# Patient Record
Sex: MALE | Race: WHITE | NOT HISPANIC OR LATINO | URBAN - METROPOLITAN AREA
[De-identification: names, ages, dates, MRNs, and addresses within clinical notes are randomized per-mention and may not be internally consistent; named-entity substitution may affect disease eponyms.]

---

## 2023-09-18 ENCOUNTER — HOSPITAL ENCOUNTER (OUTPATIENT)
Dept: DATA CONVERSION | Facility: HOSPITAL | Age: 23
Discharge: HOME | End: 2023-09-18
Payer: COMMERCIAL

## 2023-09-18 DIAGNOSIS — S76.212A STRAIN OF ADDUCTOR MUSCLE, FASCIA AND TENDON OF LEFT THIGH, INITIAL ENCOUNTER: ICD-10-CM

## 2023-10-11 PROBLEM — R82.998 DARK URINE: Status: ACTIVE | Noted: 2023-10-11

## 2023-10-11 PROBLEM — L53.8 MACULAR ERYTHEMATOUS RASH: Status: ACTIVE | Noted: 2023-10-11

## 2023-10-11 PROBLEM — J02.9 SORE THROAT: Status: RESOLVED | Noted: 2023-10-11 | Resolved: 2023-10-11

## 2023-10-11 PROBLEM — J03.90 ACUTE TONSILLITIS: Status: RESOLVED | Noted: 2023-10-11 | Resolved: 2023-10-11

## 2023-10-11 PROBLEM — S81.012A LACERATION OF LEFT KNEE: Status: RESOLVED | Noted: 2023-10-11 | Resolved: 2023-10-11

## 2023-10-16 ENCOUNTER — OFFICE VISIT (OUTPATIENT)
Dept: PRIMARY CARE | Facility: CLINIC | Age: 23
End: 2023-10-16
Payer: COMMERCIAL

## 2023-10-16 ENCOUNTER — LAB (OUTPATIENT)
Dept: LAB | Facility: LAB | Age: 23
End: 2023-10-16
Payer: COMMERCIAL

## 2023-10-16 VITALS
SYSTOLIC BLOOD PRESSURE: 118 MMHG | DIASTOLIC BLOOD PRESSURE: 80 MMHG | WEIGHT: 159 LBS | BODY MASS INDEX: 24.96 KG/M2 | HEIGHT: 67 IN | TEMPERATURE: 98.2 F | HEART RATE: 81 BPM | OXYGEN SATURATION: 98 %

## 2023-10-16 DIAGNOSIS — F51.01 PRIMARY INSOMNIA: ICD-10-CM

## 2023-10-16 DIAGNOSIS — M25.50 PAIN IN JOINT, MULTIPLE SITES: ICD-10-CM

## 2023-10-16 DIAGNOSIS — M25.50 ARTHRALGIA OF MULTIPLE JOINTS: ICD-10-CM

## 2023-10-16 DIAGNOSIS — R00.2 PALPITATIONS: ICD-10-CM

## 2023-10-16 DIAGNOSIS — Z00.00 ROUTINE GENERAL MEDICAL EXAMINATION AT A HEALTH CARE FACILITY: Primary | ICD-10-CM

## 2023-10-16 DIAGNOSIS — M25.50 ARTHRALGIA, UNSPECIFIED JOINT: ICD-10-CM

## 2023-10-16 DIAGNOSIS — Z13.220 LIPID SCREENING: ICD-10-CM

## 2023-10-16 DIAGNOSIS — Z00.00 ROUTINE GENERAL MEDICAL EXAMINATION AT A HEALTH CARE FACILITY: ICD-10-CM

## 2023-10-16 DIAGNOSIS — I86.1 VARICOCELE: ICD-10-CM

## 2023-10-16 LAB
ALBUMIN SERPL BCP-MCNC: 4.9 G/DL (ref 3.4–5)
ALP SERPL-CCNC: 46 U/L (ref 33–120)
ALT SERPL W P-5'-P-CCNC: 15 U/L (ref 10–52)
ANION GAP SERPL CALC-SCNC: 14 MMOL/L (ref 10–20)
AST SERPL W P-5'-P-CCNC: 18 U/L (ref 9–39)
BILIRUB SERPL-MCNC: 0.6 MG/DL (ref 0–1.2)
BUN SERPL-MCNC: 17 MG/DL (ref 6–23)
CALCIUM SERPL-MCNC: 9.8 MG/DL (ref 8.6–10.3)
CHLORIDE SERPL-SCNC: 100 MMOL/L (ref 98–107)
CHOLEST SERPL-MCNC: 156 MG/DL (ref 0–199)
CHOLESTEROL/HDL RATIO: 3
CO2 SERPL-SCNC: 30 MMOL/L (ref 21–32)
CREAT SERPL-MCNC: 1.01 MG/DL (ref 0.5–1.3)
ERYTHROCYTE [DISTWIDTH] IN BLOOD BY AUTOMATED COUNT: 11.8 % (ref 11.5–14.5)
ERYTHROCYTE [SEDIMENTATION RATE] IN BLOOD BY WESTERGREN METHOD: <1 MM/H (ref 0–15)
GFR SERPL CREATININE-BSD FRML MDRD: >90 ML/MIN/1.73M*2
GLUCOSE SERPL-MCNC: 92 MG/DL (ref 74–99)
HCT VFR BLD AUTO: 47.2 % (ref 41–52)
HDLC SERPL-MCNC: 52.5 MG/DL
HGB BLD-MCNC: 15.2 G/DL (ref 13.5–17.5)
LDLC SERPL CALC-MCNC: 86 MG/DL
MAGNESIUM SERPL-MCNC: 2.22 MG/DL (ref 1.6–2.4)
MCH RBC QN AUTO: 28.4 PG (ref 26–34)
MCHC RBC AUTO-ENTMCNC: 32.2 G/DL (ref 32–36)
MCV RBC AUTO: 88 FL (ref 80–100)
NON HDL CHOLESTEROL: 104 MG/DL (ref 0–149)
NRBC BLD-RTO: 0 /100 WBCS (ref 0–0)
PLATELET # BLD AUTO: 272 X10*3/UL (ref 150–450)
PMV BLD AUTO: 10.2 FL (ref 7.5–11.5)
POTASSIUM SERPL-SCNC: 4.2 MMOL/L (ref 3.5–5.3)
PROT SERPL-MCNC: 7.5 G/DL (ref 6.4–8.2)
RBC # BLD AUTO: 5.36 X10*6/UL (ref 4.5–5.9)
RHEUMATOID FACT SER NEPH-ACNC: <10 IU/ML (ref 0–15)
SODIUM SERPL-SCNC: 140 MMOL/L (ref 136–145)
TRIGL SERPL-MCNC: 90 MG/DL (ref 0–149)
TSH SERPL-ACNC: 1.44 MIU/L (ref 0.44–3.98)
VLDL: 18 MG/DL (ref 0–40)
WBC # BLD AUTO: 6.7 X10*3/UL (ref 4.4–11.3)

## 2023-10-16 PROCEDURE — 90686 IIV4 VACC NO PRSV 0.5 ML IM: CPT | Performed by: FAMILY MEDICINE

## 2023-10-16 PROCEDURE — 93000 ELECTROCARDIOGRAM COMPLETE: CPT | Performed by: FAMILY MEDICINE

## 2023-10-16 PROCEDURE — 36415 COLL VENOUS BLD VENIPUNCTURE: CPT

## 2023-10-16 PROCEDURE — 84443 ASSAY THYROID STIM HORMONE: CPT

## 2023-10-16 PROCEDURE — 99385 PREV VISIT NEW AGE 18-39: CPT | Performed by: FAMILY MEDICINE

## 2023-10-16 PROCEDURE — 1036F TOBACCO NON-USER: CPT | Performed by: FAMILY MEDICINE

## 2023-10-16 PROCEDURE — 86038 ANTINUCLEAR ANTIBODIES: CPT

## 2023-10-16 PROCEDURE — 80061 LIPID PANEL: CPT

## 2023-10-16 PROCEDURE — 85652 RBC SED RATE AUTOMATED: CPT

## 2023-10-16 PROCEDURE — 85027 COMPLETE CBC AUTOMATED: CPT

## 2023-10-16 PROCEDURE — 90471 IMMUNIZATION ADMIN: CPT | Performed by: FAMILY MEDICINE

## 2023-10-16 PROCEDURE — 80053 COMPREHEN METABOLIC PANEL: CPT

## 2023-10-16 PROCEDURE — 86431 RHEUMATOID FACTOR QUANT: CPT

## 2023-10-16 PROCEDURE — 83735 ASSAY OF MAGNESIUM: CPT

## 2023-10-16 ASSESSMENT — ENCOUNTER SYMPTOMS
FREQUENCY: 1
POLYDIPSIA: 1
CONSTIPATION: 0
SHORTNESS OF BREATH: 0
DIARRHEA: 0
NAUSEA: 1
VOMITING: 0

## 2023-10-16 NOTE — PATIENT INSTRUCTIONS
Get your blood work as ordered.  You should hear from our office with results whether they are normal are not within a few days.  Please call the office if you do not hear from us.     You should be getting cardiovascular exercise 3-5 times per week for 30-45 minutes.  This includes exercises such as running, brisk walking, biking or swimming.     Continue to follow-up with urology for testicular pain, varicocele    Insomnia  MEASURES TO IMPROVE SLEEP QUALITY  1. Go to sleep and wake up at the same time every day, even on holidays and weekends.  2. Use the bed for sleep and sexual activity only, not for reading, watching television, or working. No SCREENS - tablets, phones.  The light emitted from these devices can affect your natural sleep chemical production and negatively affect sleep.  3. Avoid napping for more than 20-30min and not within 6 hours of bedtime.  4. If sleep does not begin within 20 to 30 minutes, leave the bed and return only when drowsy.  5. Keep the room quiet, cool, and dark.  6. Use ear plugs to decrease ambient noise.  7. Avoid caffeine within six hours of bedtime perhaps AT ALL as some people can become very caffeine sensitive.  8. Avoid alcohol and smoking - although both seem to cause a relaxing effect - they in fact will cause poor quality sleep.  9. Establish relaxation pre-sleep rituals, such as a warm bath or reading.  10. Wear socks to bed (lowers core body temperature).  11. Exercise earlier in the day.    IF WAKE UP prior to desired time and cannot fall back asleep within 10min, use technique of concentrating on what you feel of your body - pressure of pillow, mattress, blankets on body.  This focus will distract your cortex of brain that keeps you awake thinking of all that happened prior, or might happen tomorrow keeping you awake.  By turning off the cortex and focusing on basic sense of feel you can relax your brain and allow falling asleep.     Over-the-counter melatonin can  oftentimes help with sleeping, 5-8 mg at night before you go to bed as the recommended dose.  Melatonin does not cause any dependence and can use on a regular basis.  Other sleep medications including some of the over-the-counter medications such as diphenhydramine can cause dependence when used on a regular basis and should only be used intermittently when really needed.     Watch for any recurring symptoms with palpitations  Recurring bowel issues

## 2023-10-16 NOTE — PROGRESS NOTES
Subjective   Patient ID: Oswaldo Baird is a 23 y.o. male who presents for Annual Exam and Establish Care. Pt was referred by his Mom which is a current pt of ours. States he developed widespread joint pains one week ago; tried ice and heat; Ibuprofen did not help. Fasting for labs. [place] to receive a flu vaccine.     Patient is new patient here to establish for his physical      Recently saw urology for varicocele, testicular pain    Not working recently   Some achiness     Elbows and shoulders are a little sore   A little swelling in wrists     Not exercising much ,  more pain ,  resting due to testicular issue   Waiting to see surgeon      Family hx :  no autoimmune diseases, heart disease , cancer     Usually pretty active  with work     Rare elevated HR about 1-2 times per week recently   Sometimes trouble sleeping ,  and staying asleep   No relief with melatonin ,     No depression ,  anxiety        ROS :  ( No or Yes )  Any eye problems:    N  Frequent nasal congestion or sneezing:  N  Difficulty hearing:  N  Ear problems:   N  Asthma or wheezing:   N  Frequent cough:   N  Shortness of breath:N  Hemoptysis: N  Hx of TB: N  High blood pressure: N  Heart disease: N  Heart murmur:N  Chest pain or pressure with exertion:N  Leg pains with walking up hill: N  Fast heartbeat or palpitations:y  Varicose veins: y  Difficulty swallowing foods or liquids: N  Abdominal pains: N  Frequent indigestion or heartburn: N  Constipation: N  Diarrhea or loose stools: N  Weight changes recently: N  Change in bowel movements: N  An ulcer: N  Black stools: N  Jaundice, hepatitis or liver problems: N  Gallstones or gallbladder problems: N  Stomach or intestinal problems: N  Vomited blood : N  Blood in bowel movements: y  Sickle cell trait  or Anemia: N  Been refused as a blood donor: N  Problems with her kidney, bladder, or prostate: N  Loss of control of your urine: N  Pain or burning with urination: N  Blood in her urine:  "N  Trouble starting flow of urine: N  Frequent urination at night: N  History of venereal disease: N  Any skin problems: N  Diabetes: N  Thyroid disease: N  Frequent back pain: y  Pain or swelling around joints: y  Broken any bones: N  Frequent headaches: N  Dizziness: N  Have you ever had Seizures or convulsions: N  Have you ever temporarily lost control of your hand or foot : N   Had a stroke or been paralyzed : N  Temporarily lost your ability to speak: N  Fainted or lost consciousness: N  Hallucinations: N  Nervousness: N  Do you take medications for your nerves: N  Trouble falling asleep or staying asleep: y  Do you feel tired even after a good night sleep: y  Do you feel down in the dumps or depressed: N  Frequent crying: N  Using alcohol excessively: N  Any street drug use : N  Do have any other medical problems that are concerns : y joint pains    Review of Systems   Respiratory:  Negative for shortness of breath.    Cardiovascular:  Negative for chest pain.   Gastrointestinal:  Positive for nausea. Negative for constipation, diarrhea and vomiting.        One bloody stool a month ago      Endocrine: Positive for polydipsia and polyuria.   Genitourinary:  Positive for frequency.        Testicular pain        Objective   /80   Pulse 81   Temp 36.8 °C (98.2 °F)   Ht 1.708 m (5' 7.25\")   Wt 72.1 kg (159 lb)   SpO2 98%   BMI 24.72 kg/m²     Physical Exam  Constitutional:       General: He is not in acute distress.     Appearance: Normal appearance.   HENT:      Head: Normocephalic and atraumatic.      Right Ear: Tympanic membrane and ear canal normal.      Left Ear: Tympanic membrane and ear canal normal.      Mouth/Throat:      Mouth: Mucous membranes are moist.   Eyes:      Conjunctiva/sclera: Conjunctivae normal.      Pupils: Pupils are equal, round, and reactive to light.   Neck:      Vascular: No carotid bruit.   Cardiovascular:      Rate and Rhythm: Normal rate and regular rhythm.      Heart " sounds: No murmur heard.  Pulmonary:      Effort: Pulmonary effort is normal.      Breath sounds: Normal breath sounds. No wheezing or rhonchi.   Abdominal:      General: Bowel sounds are normal.      Palpations: Abdomen is soft.   Musculoskeletal:         General: No swelling.      Cervical back: No rigidity.   Lymphadenopathy:      Cervical: No cervical adenopathy.   Skin:     General: Skin is warm and dry.      Findings: No rash.   Neurological:      Mental Status: He is alert.         Assessment/Plan   Problem List Items Addressed This Visit    None  Visit Diagnoses         Codes    Routine general medical examination at a health care facility    -  Primary Z00.00    Relevant Orders    CBC    Comprehensive Metabolic Panel    Lipid Panel    SAUMYA with Reflex to DORA    Rheumatoid Factor    Sedimentation Rate    Tsh With Reflex To Free T4 If Abnormal    Magnesium    ECG 12 lead (Clinic Performed) (Completed)    Flu vaccine (IIV4) age 6 months and greater, preservative free (Completed)    Lipid screening     Z13.220    Relevant Orders    CBC    Comprehensive Metabolic Panel    Lipid Panel    SAUMYA with Reflex to DORA    Rheumatoid Factor    Sedimentation Rate    Tsh With Reflex To Free T4 If Abnormal    Magnesium    ECG 12 lead (Clinic Performed) (Completed)    Varicocele     I86.1    Relevant Orders    CBC    Comprehensive Metabolic Panel    Lipid Panel    SAUMYA with Reflex to DORA    Rheumatoid Factor    Sedimentation Rate    Tsh With Reflex To Free T4 If Abnormal    Magnesium    ECG 12 lead (Clinic Performed) (Completed)    Primary insomnia     F51.01    Relevant Orders    CBC    Comprehensive Metabolic Panel    Lipid Panel    SAUMYA with Reflex to DORA    Rheumatoid Factor    Sedimentation Rate    Tsh With Reflex To Free T4 If Abnormal    Magnesium    ECG 12 lead (Clinic Performed) (Completed)    Arthralgia, unspecified joint     M25.50    Relevant Orders    CBC    Comprehensive Metabolic Panel    Lipid Panel    SAUMYA with  Reflex to DORA    Rheumatoid Factor    Sedimentation Rate    Tsh With Reflex To Free T4 If Abnormal    Magnesium    ECG 12 lead (Clinic Performed) (Completed)    Palpitations     R00.2    Relevant Orders    CBC    Comprehensive Metabolic Panel    Lipid Panel    SAUMYA with Reflex to DORA    Rheumatoid Factor    Sedimentation Rate    Tsh With Reflex To Free T4 If Abnormal    Magnesium    ECG 12 lead (Clinic Performed) (Completed)    Arthralgia of multiple joints     M25.50    Relevant Orders    CBC    Comprehensive Metabolic Panel    Lipid Panel    SAUMYA with Reflex to DORA    Rheumatoid Factor    Sedimentation Rate    Tsh With Reflex To Free T4 If Abnormal    Magnesium    ECG 12 lead (Clinic Performed) (Completed)    Pain in joint, multiple sites     M25.50    Relevant Orders    CBC    Comprehensive Metabolic Panel    Lipid Panel    SAUMYA with Reflex to DORA    Rheumatoid Factor    Sedimentation Rate    Tsh With Reflex To Free T4 If Abnormal    Magnesium    ECG 12 lead (Clinic Performed) (Completed)

## 2023-10-17 ENCOUNTER — TELEPHONE (OUTPATIENT)
Dept: PRIMARY CARE | Facility: CLINIC | Age: 23
End: 2023-10-17
Payer: COMMERCIAL

## 2023-10-17 LAB — ANA SER QL HEP2 SUBST: NEGATIVE

## 2023-10-17 NOTE — TELEPHONE ENCOUNTER
----- Message from Tiffany Garcia MD sent at 10/17/2023  1:58 PM EDT -----  Labs are normal: Arthritic markers, blood count, cholesterol, thyroid, kidney and liver function

## 2023-10-17 NOTE — TELEPHONE ENCOUNTER
Result Communication    Resulted Orders   CBC   Result Value Ref Range    WBC 6.7 4.4 - 11.3 x10*3/uL    nRBC 0.0 0.0 - 0.0 /100 WBCs    RBC 5.36 4.50 - 5.90 x10*6/uL    Hemoglobin 15.2 13.5 - 17.5 g/dL    Hematocrit 47.2 41.0 - 52.0 %    MCV 88 80 - 100 fL    MCH 28.4 26.0 - 34.0 pg    MCHC 32.2 32.0 - 36.0 g/dL    RDW 11.8 11.5 - 14.5 %    Platelets 272 150 - 450 x10*3/uL    MPV 10.2 7.5 - 11.5 fL   Comprehensive Metabolic Panel   Result Value Ref Range    Glucose 92 74 - 99 mg/dL    Sodium 140 136 - 145 mmol/L    Potassium 4.2 3.5 - 5.3 mmol/L    Chloride 100 98 - 107 mmol/L    Bicarbonate 30 21 - 32 mmol/L    Anion Gap 14 10 - 20 mmol/L    Urea Nitrogen 17 6 - 23 mg/dL    Creatinine 1.01 0.50 - 1.30 mg/dL    eGFR >90 >60 mL/min/1.73m*2      Comment:      Calculations of estimated GFR are performed using the 2021 CKD-EPI Study Refit equation without the race variable for the IDMS-Traceable creatinine methods.  https://jasn.asnjournals.org/content/early/2021/09/22/ASN.8058947766    Calcium 9.8 8.6 - 10.3 mg/dL    Albumin 4.9 3.4 - 5.0 g/dL    Alkaline Phosphatase 46 33 - 120 U/L    Total Protein 7.5 6.4 - 8.2 g/dL    AST 18 9 - 39 U/L    Bilirubin, Total 0.6 0.0 - 1.2 mg/dL    ALT 15 10 - 52 U/L      Comment:      Patients treated with Sulfasalazine may generate falsely decreased results for ALT.   Lipid Panel   Result Value Ref Range    Cholesterol 156 0 - 199 mg/dL      Comment:            Age      Desirable   Borderline High   High     0-19 Y     0 - 169       170 - 199     >/= 200    20-24 Y     0 - 189       190 - 224     >/= 225         >24 Y     0 - 199       200 - 239     >/= 240   **All ranges are based on fasting samples. Specific   therapeutic targets will vary based on patient-specific   cardiac risk.    Pediatric guidelines reference:Pediatrics 2011, 128(S5).Adult guidelines reference: NCEP ATPIII Guidelines,MACY 2001, 258:2486-97    Venipuncture immediately after or during the administration of  Metamizole may lead to falsely low results. Testing should be performed immediately prior to Metamizole dosing.    HDL-Cholesterol 52.5 mg/dL      Comment:        Age       Very Low   Low     Normal    High    0-19 Y    < 35      < 40     40-45     ----  20-24 Y    ----     < 40      >45      ----        >24 Y      ----     < 40     40-60      >60      Cholesterol/HDL Ratio 3.0       Comment:        Ref Values  Desirable  < 3.4  High Risk  > 5.0    LDL Calculated 86 <=119 mg/dL      Comment:                                  Near   Borderline      AGE      Desirable  Optimal    High     High     Very High     0-19 Y     0 - 109     ---    110-129   >/= 130     ----    20-24 Y     0 - 119     ---    120-159   >/= 160     ----      >24 Y     0 -  99   100-129  130-159   160-189     >/=190      VLDL 18 0 - 40 mg/dL    Triglycerides 90 0 - 149 mg/dL      Comment:         Age         Desirable   Borderline High   High     Very High   0 D-90 D    19 - 174         ----         ----        ----  91 D- 9 Y     0 -  74        75 -  99     >/= 100      ----    10-19 Y     0 -  89        90 - 129     >/= 130      ----    20-24 Y     0 - 114       115 - 149     >/= 150      ----         >24 Y     0 - 149       150 - 199    200- 499    >/= 500    Venipuncture immediately after or during the administration of Metamizole may lead to falsely low results. Testing should be performed immediately prior to Metamizole dosing.    Non HDL Cholesterol 104 0 - 149 mg/dL      Comment:            Age       Desirable   Borderline High   High     Very High     0-19 Y     0 - 119       120 - 144     >/= 145    >/= 160    20-24 Y     0 - 149       150 - 189     >/= 190      ----         >24 Y    30 mg/dL above LDL Cholesterol goal     SAUMYA with Reflex to DORA   Result Value Ref Range    SAUMYA Negative Negative      Comment:      The Antinuclear Antibody (SAUMYA) test was performed using  indirect immunofluorescence assay with HEp-2 cells slide.    Rheumatoid Factor   Result Value Ref Range    Rheumatoid Factor <10 0 - 15 IU/mL   Sedimentation Rate   Result Value Ref Range    Sedimentation Rate <1 0 - 15 mm/h   Tsh With Reflex To Free T4 If Abnormal   Result Value Ref Range    Thyroid Stimulating Hormone 1.44 0.44 - 3.98 mIU/L    Narrative    TSH testing is performed using different testing methodology at [facility] than at other [facility]. Direct result comparisons should only be made within the same method.     Magnesium   Result Value Ref Range    Magnesium 2.22 1.60 - 2.40 mg/dL       3:56 PM  Tiffany Garcia MD  [place] [address] Clinical Support Staff  Labs are normal: Arthritic markers, blood count, cholesterol, thyroid, kidney and liver function    Results were successfully communicated with the patient and they acknowledged their understanding.

## 2023-10-17 NOTE — TELEPHONE ENCOUNTER
Result Communication    Resulted Orders   CBC   Result Value Ref Range    WBC 6.7 4.4 - 11.3 x10*3/uL    nRBC 0.0 0.0 - 0.0 /100 WBCs    RBC 5.36 4.50 - 5.90 x10*6/uL    Hemoglobin 15.2 13.5 - 17.5 g/dL    Hematocrit 47.2 41.0 - 52.0 %    MCV 88 80 - 100 fL    MCH 28.4 26.0 - 34.0 pg    MCHC 32.2 32.0 - 36.0 g/dL    RDW 11.8 11.5 - 14.5 %    Platelets 272 150 - 450 x10*3/uL    MPV 10.2 7.5 - 11.5 fL   Comprehensive Metabolic Panel   Result Value Ref Range    Glucose 92 74 - 99 mg/dL    Sodium 140 136 - 145 mmol/L    Potassium 4.2 3.5 - 5.3 mmol/L    Chloride 100 98 - 107 mmol/L    Bicarbonate 30 21 - 32 mmol/L    Anion Gap 14 10 - 20 mmol/L    Urea Nitrogen 17 6 - 23 mg/dL    Creatinine 1.01 0.50 - 1.30 mg/dL    eGFR >90 >60 mL/min/1.73m*2      Comment:      Calculations of estimated GFR are performed using the 2021 CKD-EPI Study Refit equation without the race variable for the IDMS-Traceable creatinine methods.  https://jasn.asnjournals.org/content/early/2021/09/22/ASN.6852495181    Calcium 9.8 8.6 - 10.3 mg/dL    Albumin 4.9 3.4 - 5.0 g/dL    Alkaline Phosphatase 46 33 - 120 U/L    Total Protein 7.5 6.4 - 8.2 g/dL    AST 18 9 - 39 U/L    Bilirubin, Total 0.6 0.0 - 1.2 mg/dL    ALT 15 10 - 52 U/L      Comment:      Patients treated with Sulfasalazine may generate falsely decreased results for ALT.   Lipid Panel   Result Value Ref Range    Cholesterol 156 0 - 199 mg/dL      Comment:            Age      Desirable   Borderline High   High     0-19 Y     0 - 169       170 - 199     >/= 200    20-24 Y     0 - 189       190 - 224     >/= 225         >24 Y     0 - 199       200 - 239     >/= 240   **All ranges are based on fasting samples. Specific   therapeutic targets will vary based on patient-specific   cardiac risk.    Pediatric guidelines reference:Pediatrics 2011, 128(S5).Adult guidelines reference: NCEP ATPIII Guidelines,MACY 2001, 258:2486-97    Venipuncture immediately after or during the administration of  Metamizole may lead to falsely low results. Testing should be performed immediately prior to Metamizole dosing.    HDL-Cholesterol 52.5 mg/dL      Comment:        Age       Very Low   Low     Normal    High    0-19 Y    < 35      < 40     40-45     ----  20-24 Y    ----     < 40      >45      ----        >24 Y      ----     < 40     40-60      >60      Cholesterol/HDL Ratio 3.0       Comment:        Ref Values  Desirable  < 3.4  High Risk  > 5.0    LDL Calculated 86 <=119 mg/dL      Comment:                                  Near   Borderline      AGE      Desirable  Optimal    High     High     Very High     0-19 Y     0 - 109     ---    110-129   >/= 130     ----    20-24 Y     0 - 119     ---    120-159   >/= 160     ----      >24 Y     0 -  99   100-129  130-159   160-189     >/=190      VLDL 18 0 - 40 mg/dL    Triglycerides 90 0 - 149 mg/dL      Comment:         Age         Desirable   Borderline High   High     Very High   0 D-90 D    19 - 174         ----         ----        ----  91 D- 9 Y     0 -  74        75 -  99     >/= 100      ----    10-19 Y     0 -  89        90 - 129     >/= 130      ----    20-24 Y     0 - 114       115 - 149     >/= 150      ----         >24 Y     0 - 149       150 - 199    200- 499    >/= 500    Venipuncture immediately after or during the administration of Metamizole may lead to falsely low results. Testing should be performed immediately prior to Metamizole dosing.    Non HDL Cholesterol 104 0 - 149 mg/dL      Comment:            Age       Desirable   Borderline High   High     Very High     0-19 Y     0 - 119       120 - 144     >/= 145    >/= 160    20-24 Y     0 - 149       150 - 189     >/= 190      ----         >24 Y    30 mg/dL above LDL Cholesterol goal     SAUMYA with Reflex to DORA   Result Value Ref Range    SAUMYA Negative Negative      Comment:      The Antinuclear Antibody (SAUMYA) test was performed using  indirect immunofluorescence assay with HEp-2 cells slide.    Rheumatoid Factor   Result Value Ref Range    Rheumatoid Factor <10 0 - 15 IU/mL   Sedimentation Rate   Result Value Ref Range    Sedimentation Rate <1 0 - 15 mm/h   Tsh With Reflex To Free T4 If Abnormal   Result Value Ref Range    Thyroid Stimulating Hormone 1.44 0.44 - 3.98 mIU/L    Narrative    TSH testing is performed using different testing methodology at [facility] than at other [facility]. Direct result comparisons should only be made within the same method.     Magnesium   Result Value Ref Range    Magnesium 2.22 1.60 - 2.40 mg/dL       3:58 PM  Tiffany Garcia MD  [place] [address] Clinical Support Staff  Labs are normal: Arthritic markers, blood count, cholesterol, thyroid, kidney and liver function    Results were successfully communicated with the patient and they acknowledged their understanding.

## 2023-11-02 ENCOUNTER — OFFICE VISIT (OUTPATIENT)
Dept: PRIMARY CARE | Facility: CLINIC | Age: 23
End: 2023-11-02
Payer: COMMERCIAL

## 2023-11-02 ENCOUNTER — ANCILLARY PROCEDURE (OUTPATIENT)
Dept: RADIOLOGY | Facility: CLINIC | Age: 23
End: 2023-11-02
Payer: COMMERCIAL

## 2023-11-02 VITALS
DIASTOLIC BLOOD PRESSURE: 82 MMHG | TEMPERATURE: 98.4 F | OXYGEN SATURATION: 99 % | HEART RATE: 65 BPM | SYSTOLIC BLOOD PRESSURE: 116 MMHG | HEIGHT: 67 IN | BODY MASS INDEX: 24.01 KG/M2 | WEIGHT: 153 LBS

## 2023-11-02 DIAGNOSIS — R07.89 CHEST PRESSURE: ICD-10-CM

## 2023-11-02 DIAGNOSIS — R07.89 CHEST PRESSURE: Primary | ICD-10-CM

## 2023-11-02 DIAGNOSIS — R00.2 PALPITATIONS: ICD-10-CM

## 2023-11-02 PROCEDURE — 71111 X-RAY EXAM RIBS/CHEST4/> VWS: CPT | Mod: FY

## 2023-11-02 PROCEDURE — 71111 X-RAY EXAM RIBS/CHEST4/> VWS: CPT | Performed by: RADIOLOGY

## 2023-11-02 PROCEDURE — 93000 ELECTROCARDIOGRAM COMPLETE: CPT | Performed by: FAMILY MEDICINE

## 2023-11-02 PROCEDURE — 99214 OFFICE O/P EST MOD 30 MIN: CPT | Performed by: FAMILY MEDICINE

## 2023-11-02 PROCEDURE — 1036F TOBACCO NON-USER: CPT | Performed by: FAMILY MEDICINE

## 2023-11-02 RX ORDER — ONDANSETRON 4 MG/1
4 TABLET, FILM COATED ORAL EVERY 8 HOURS PRN
COMMUNITY
Start: 2023-10-27

## 2023-11-02 RX ORDER — KETOROLAC TROMETHAMINE 10 MG/1
10 TABLET, FILM COATED ORAL 3 TIMES DAILY
COMMUNITY
Start: 2023-10-30 | End: 2023-11-04

## 2023-11-02 RX ORDER — AMOXICILLIN 875 MG/1
TABLET, FILM COATED ORAL
COMMUNITY
Start: 2023-10-27 | End: 2023-12-13 | Stop reason: ALTCHOICE

## 2023-11-02 NOTE — PROGRESS NOTES
"Subjective   Patient ID: Oswaldo Baird is a 23 y.o. male who presents for intermittent  Chest Pain and ER follow up. Pt went to [place] ER this Monday due to right groin pain; was prescribed Toradol. States he was sitting eating his lunch yesterday and after wards he felt overly full in his upper abdomen; tenderness in his ribs and elevated heart rate with an irregular beat.  Pt stopped caffeine 2 months ago because he has been home on a medical leave due to a work related injury. Pt is schedule dot see a [facility] microsurgeon/ urologist on 11/10/23.     Was eating   Had soreness on lower ribs   Slight SHORTNESS OF BREATH   Heart was racing , felt like irregular   For about 20-30  min      A little mid sternal tightness today       Previously only about once per month          Review of Systems    Objective   /82   Pulse 65   Temp 36.9 °C (98.4 °F)   Ht 1.708 m (5' 7.25\")   Wt 69.4 kg (153 lb)   SpO2 99%   BMI 23.79 kg/m²     Physical Exam  Constitutional:       Appearance: Normal appearance. He is well-developed.   Cardiovascular:      Rate and Rhythm: Normal rate and regular rhythm.      Heart sounds: Normal heart sounds. No murmur heard.     Comments: A little tenderness along the bilateral anterior lower ribs and sternal  Pulmonary:      Effort: Pulmonary effort is normal.      Breath sounds: Normal breath sounds.   Neurological:      General: No focal deficit present.      Mental Status: He is alert.   Psychiatric:         Mood and Affect: Mood normal.         Behavior: Behavior normal.         Assessment/Plan   Problem List Items Addressed This Visit    None  Visit Diagnoses         Codes    Chest pressure    -  Primary R07.89    Relevant Orders    ECG 12 lead (Clinic Performed) (Completed)    XR ribs 3 views bilateral w chest posteroanterior    Transthoracic Echo (TTE) Complete    Holter or Event Cardiac Monitor    Palpitations     R00.2    Relevant Orders    ECG 12 lead (Clinic " Performed) (Completed)    XR ribs 3 views bilateral w chest posteroanterior    Transthoracic Echo (TTE) Complete    Holter or Event Cardiac Monitor

## 2023-11-06 ENCOUNTER — TELEPHONE (OUTPATIENT)
Dept: PRIMARY CARE | Facility: CLINIC | Age: 23
End: 2023-11-06
Payer: COMMERCIAL

## 2023-11-06 NOTE — TELEPHONE ENCOUNTER
----- Message from Tiffany Garcia MD sent at 11/6/2023  9:39 AM EST -----  Rib and chest x-ray are normal

## 2023-11-06 NOTE — TELEPHONE ENCOUNTER
Result Communication    Resulted Orders   XR ribs 3 views bilateral w chest posteroanterior    Narrative    Interpreted By:  Darcie Melendez,   STUDY:  XR RIBS 3 VIEWS BILATERAL WITH CHEST POSTEROANTERIOR;      INDICATION:  Signs/Symptoms:pain.      COMPARISON:  None.      ACCESSION NUMBER(S):  [id number]      ORDERING CLINICIAN:  VASQUEZ RYAN      FINDINGS:  The cardiac silhouette size is within normal limits.  There is no focal consolidation, edema or pneumothorax.  No sizeable pleural effusion.  No acute osseous abnormality.  No displaced rib fractures bilaterally.        Impression    1. No displaced rib fractures bilaterally.  2. No acute cardiopulmonary process.      Signed by: Darcie Melendez 11/3/2023 9:57 PM  Dictation workstation:   [id number]       11:29 AM  Vasquez Ryan MD  [place] [address] Clinical Support Staff  Rib and chest x-ray are normal    Results were successfully communicated with the patient and they acknowledged their understanding.

## 2023-11-07 ENCOUNTER — HOSPITAL ENCOUNTER (OUTPATIENT)
Dept: CARDIOLOGY | Facility: HOSPITAL | Age: 23
Discharge: HOME | End: 2023-11-07
Payer: COMMERCIAL

## 2023-11-07 DIAGNOSIS — R07.89 CHEST PRESSURE: ICD-10-CM

## 2023-11-07 DIAGNOSIS — R00.2 PALPITATIONS: ICD-10-CM

## 2023-11-07 LAB
AORTIC VALVE MEAN GRADIENT: 2
AORTIC VALVE PEAK VELOCITY: 0.91
AV PEAK GRADIENT: 3.3
AVA (PEAK VEL): 2.68
AVA (VTI): 2.35
EJECTION FRACTION APICAL 4 CHAMBER: 57.6
EJECTION FRACTION: 57
LEFT ATRIUM VOLUME AREA LENGTH INDEX BSA: 15.5
LEFT VENTRICLE INTERNAL DIMENSION DIASTOLE: 4.3 (ref 3.5–6)
LEFT VENTRICULAR OUTFLOW TRACT DIAMETER: 2
MITRAL VALVE E/A RATIO: 1.44
MITRAL VALVE E/E' RATIO: 4.66
RIGHT VENTRICLE FREE WALL PEAK S': 11.5
RIGHT VENTRICLE PEAK SYSTOLIC PRESSURE: 18.5
TRICUSPID ANNULAR PLANE SYSTOLIC EXCURSION: 2

## 2023-11-07 PROCEDURE — 93306 TTE W/DOPPLER COMPLETE: CPT

## 2023-11-07 PROCEDURE — 93306 TTE W/DOPPLER COMPLETE: CPT | Performed by: INTERNAL MEDICINE

## 2023-11-07 PROCEDURE — 93246 EXT ECG>7D<15D RECORDING: CPT

## 2023-11-08 ENCOUNTER — TELEPHONE (OUTPATIENT)
Dept: PRIMARY CARE | Facility: CLINIC | Age: 23
End: 2023-11-08
Payer: COMMERCIAL

## 2023-11-08 NOTE — TELEPHONE ENCOUNTER
Result Communication    Resulted Orders   Transthoracic Echo (TTE) Complete   Result Value Ref Range    AV pk jamaal 0.91     AV mn grad 2.0     LVOT diam 2.00     LV biplane EF 57     MV avg E/e' ratio 4.66     MV E/A ratio 1.44     LA vol index A/L 15.5     Tricuspid annular plane systolic excursion 2.0     RV free wall pk S' 11.50     LVIDd 4.30     RVSP 18.5     Aortic Valve Area by Continuity of VTI 2.35     Aortic Valve Area by Continuity of Peak Velocity 2.68     AV pk grad 3.3     LV A4C EF 57.6     [facility], [address] [place] [address]                Tel [phone] and Fax [phone]    TRANSTHORACIC ECHOCARDIOGRAM REPORT       Patient Name:      [facility] Holcomb Physician:    [id number] Emil Monroy MD  Study Date:        11/7/2023            Ordering Provider:    [id number] VASQUEZ RYAN  MRN/PID:           [id number]             Fellow:  Accession#:        [id number]         Nurse:  Date of Birth/Age: 2000 / 23 years Sonographer:          Sarah Carpio RDCS  Gender:            M                    Additional Staff:  Height:            170.18 cm            Admit Date:  Weight:            69.40 kg             Admission Status:     Outpatient  BSA:               1.80 m2              Encounter#:           [id number]                                          Department Location:  [facility] Non                                                                Invasive  Blood Pressure: 116 /82 mmHg    Study Type:    TRANSTHORACIC ECHO (TTE) COMPLETE  Diagnosis/ICD: Palpitations-R00.2; Other chest pain-R07.89  Indication:    Palpitations, Chest pressure  CPT Code:      Echo Complete w Full Doppler-30086    Patient History:  Pertinent History: Palpitations.    Study Detail: The following Echo studies were performed: 2D, M-Mode,  Doppler and                color flow. Technically challenging study due to prominent lung                artifact and body habitus. The patient was awake.       PHYSICIAN INTERPRETATION:  Left Ventricle: The left ventricular systolic function is normal, with an estimated ejection fraction of 55-60%. There are no regional wall motion abnormalities. The left ventricular cavity size is normal. Spectral Doppler shows a normal pattern of left ventricular diastolic filling.  Left Atrium: The left atrium is normal in size.  Right Ventricle: The right ventricle is normal in size. There is normal right ventricular global systolic function.  Right Atrium: The right atrium is normal in size.  Aortic Valve: The aortic valve appears structurally normal. There is no evidence of aortic valve regurgitation. The peak instantaneous gradient of the aortic valve is 3.3 mmHg. The mean gradient of the aortic valve is 2.0 mmHg.  Mitral Valve: The mitral valve is normal in structure. There is mild mitral valve regurgitation.  Tricuspid Valve: The tricuspid valve is structurally normal. There is mild tricuspid regurgitation.  Pulmonic Valve: The pulmonic valve is structurally normal. There is no indication of pulmonic valve regurgitation.  Pericardium: There is no pericardial effusion noted.  Aorta: The aortic root is normal.  Pulmonary Artery: The tricuspid regurgitant velocity is 1.97 m/s, and with an estimated right atrial pressure of 3 mmHg, the estimated pulmonary artery pressure is normal with the RVSP at 18.5 mmHg.  Systemic Veins: The inferior vena cava appears to be of normal size.       CONCLUSIONS:   1. Left ventricular systolic function is normal with a 55-60% estimated ejection fraction.   2. There is mild mitral and tricuspid regurgitation.    QUANTITATIVE DATA SUMMARY:  2D MEASUREMENTS:                           Normal Ranges:  Ao Root d:     3.60 cm   (2.0-3.7cm)  IVSd:          0.60 cm   (0.6-1.1cm)  LVPWd:         0.72 cm    (0.6-1.1cm)  LVIDd:         4.30 cm   (3.9-5.9cm)  LVIDs:         2.94 cm  LV Mass Index: 45.3 g/m2  LV % FS        31.6 %    LA VOLUME:                                Normal Ranges:  LA Vol A4C:        26.3 ml    (22+/-6mL/m2)  LA Vol A2C:        27.9 ml  LA Vol BP:         27.9 ml  LA Vol Index A4C:  14.6ml/m2  LA Vol Index A2C:  15.5 ml/m2  LA Vol Index BP:   15.5 ml/m2  LA Area A4C:       12.1 cm2  LA Area A2C:       12.1 cm2  LA Major Axis A4C: 4.7 cm  LA Major Axis A2C: 4.5 cm  LA Volume Index:   14.4 ml/m2  LA Vol A4C:        25.0 ml  LA Vol A2C:        27.0 ml    RA VOLUME BY A/L METHOD:                        Normal Ranges:  RA Area A4C: 11.7 cm2    M-MODE MEASUREMENTS:                   Normal Ranges:  Ao Root: 3.30 cm (2.0-3.7cm)  LAs:     2.80 cm (2.7-4.0cm)    AORTA MEASUREMENTS:                       Normal Ranges:  Ao Sinus, d: 3.60 cm (2.1-3.5cm)  Asc Ao, d:   3.10 cm (2.1-3.4cm)    LV SYSTOLIC FUNCTION BY 2D PLANIMETRY (MOD):                      Normal Ranges:  EF-A4C View: 57.6 % (>=55%)  EF-A2C View: 56.3 %  EF-Biplane:  56.8 %    LV DIASTOLIC FUNCTION:                                Normal Ranges:  MV Peak E:        0.61 m/s    (0.7-1.2 m/s)  MV Peak A:        0.42 m/s    (0.42-0.7 m/s)  E/A Ratio:        1.44        (1.0-2.2)  MV e'             0.13 m/s    (>8.0)  MV lateral e'     0.13 m/s  MV medial e'      0.10 m/s  MV A Dur:         108.00 msec  E/e' Ratio:       4.66        (<8.0)  PulmV Sys Jose:    38.90 cm/s  PulmV Rosario Jose:   30.70 cm/s  PulmV S/D Jose:    1.30  PulmV A Revs Jose: 29.30 cm/s  PulmV A Revs Dur: 114.00 msec    MITRAL VALVE:                  Normal Ranges:  MV DT: 187 msec (150-240msec)    AORTIC VALVE:                                    Normal Ranges:  AoV Vmax:                0.91 m/s (<=1.7m/s)  AoV Peak PG:             3.3 mmHg (<20mmHg)  AoV Mean P.0 mmHg (1.7-11.5mmHg)  LVOT Max Jose:            0.78 m/s (<=1.1m/s)  AoV VTI:                 15.50 cm  (18-25cm)  LVOT VTI:                11.60 cm  LVOT Diameter:           2.00 cm  (1.8-2.4cm)  AoV Area, VTI:           2.35 cm2 (2.5-5.5cm2)  AoV Area,Vmax:           2.68 cm2 (2.5-4.5cm2)  AoV Dimensionless Index: 0.75       RIGHT VENTRICLE:  RV Basal 2.85 cm  RV Mid   1.70 cm  RV Major 8.3 cm  TAPSE:   19.7 mm  RV s'    0.12 m/s    TRICUSPID VALVE/RVSP:                              Normal Ranges:  Peak TR Velocity: 1.97 m/s  RV Syst Pressure: 18.5 mmHg (< 30mmHg)  IVC Diam:         1.43 cm    PULMONIC VALVE:                       Normal Ranges:  PV Max Jose: 0.7 m/s  (0.6-0.9m/s)  PV Max P.7 mmHg    Pulmonary Veins:  PulmV A Revs Dur: 114.00 msec  PulmV A Revs Jose: 29.30 cm/s  PulmV Rosario Jose:   30.70 cm/s  PulmV S/D Jose:    1.30  PulmV Sys Jose:    38.90 cm/s       [id number] Emil Monroy MD  Electronically signed on 2023 at 4:06:01 PM         ** Final **         12:07 PM  Tiffany Garcia MD  [place] [address] Clinical Support Staff  Echocardiogram looks good, heart is pumping normally no substantial abnormalities    Results were successfully communicated with the patient and they acknowledged their understanding.

## 2023-12-13 RX ORDER — MELOXICAM 15 MG/1
15 TABLET ORAL
COMMUNITY
Start: 2023-11-20

## 2023-12-14 ENCOUNTER — OFFICE VISIT (OUTPATIENT)
Dept: PRIMARY CARE | Facility: CLINIC | Age: 23
End: 2023-12-14
Payer: COMMERCIAL

## 2023-12-14 VITALS
OXYGEN SATURATION: 99 % | TEMPERATURE: 99 F | SYSTOLIC BLOOD PRESSURE: 124 MMHG | DIASTOLIC BLOOD PRESSURE: 84 MMHG | HEIGHT: 67 IN | BODY MASS INDEX: 23.86 KG/M2 | HEART RATE: 93 BPM | WEIGHT: 152 LBS

## 2023-12-14 DIAGNOSIS — H53.10 EYE STRAIN: ICD-10-CM

## 2023-12-14 DIAGNOSIS — G44.201 ACUTE INTRACTABLE TENSION-TYPE HEADACHE: Primary | ICD-10-CM

## 2023-12-14 PROBLEM — I86.1 BILATERAL VARICOCELES: Status: ACTIVE | Noted: 2023-12-07

## 2023-12-14 PROCEDURE — 1036F TOBACCO NON-USER: CPT | Performed by: FAMILY MEDICINE

## 2023-12-14 PROCEDURE — 99213 OFFICE O/P EST LOW 20 MIN: CPT | Performed by: FAMILY MEDICINE

## 2023-12-14 RX ORDER — CYCLOBENZAPRINE HCL 10 MG
10 TABLET ORAL NIGHTLY PRN
Qty: 30 TABLET | Refills: 0 | Status: SHIPPED | OUTPATIENT
Start: 2023-12-14 | End: 2024-02-12

## 2023-12-14 ASSESSMENT — ENCOUNTER SYMPTOMS
COUGH: 0
NAUSEA: 0
VOMITING: 0

## 2023-12-14 NOTE — PROGRESS NOTES
"Subjective   Patient ID: Oswaldo Baird is a 23 y.o. male who presents for Headache. States his HA's have been present intermittently for one month now. Pain is usually located in the posterior portion of his head accompanied by tension in his neck. Second concern is eyestrain; last eye exam was over 5 yrs ago. Taking OTC Tylenol for his HA's; was taking Ibuprofen and Tylenol combined to relieve his testicular / groin pain. Pt is scheduled to undergo varicocele surgery on 12/21/23 with Dr. Maciel Johnson.    Headache     Daily for a month   Better in AM,  gets worse during day   Sharp at night    Some stiffness  in neck    ,laying down at lot     Pain with walking due to varicocele , not doing a lot   Has surgery 12/21     Was taking ibuprofen ,  tylenol   Needs to stay off tylenol      Some eyestrain lately  with bright screen   Headaches when younger     Glasses with driving              Review of Systems   HENT:  Positive for congestion.    Respiratory:  Negative for cough.    Gastrointestinal:  Negative for nausea and vomiting.       Objective   /84   Pulse 93   Temp 37.2 °C (99 °F)   Ht 1.708 m (5' 7.25\")   Wt 68.9 kg (152 lb)   SpO2 99%   BMI 23.63 kg/m²     Physical Exam  Constitutional:       Appearance: Normal appearance.   HENT:      Head: Normocephalic and atraumatic.      Right Ear: Tympanic membrane and ear canal normal.      Left Ear: Tympanic membrane and ear canal normal.      Nose: No nasal deformity.      Right Sinus: No maxillary sinus tenderness or frontal sinus tenderness.      Left Sinus: No maxillary sinus tenderness or frontal sinus tenderness.      Mouth/Throat:      Mouth: Mucous membranes are moist. No oral lesions.      Tongue: No lesions.      Pharynx: Oropharynx is clear.   Neck:      Comments: Tenderness along the paraspinal muscles bilaterally  Cardiovascular:      Rate and Rhythm: Normal rate and regular rhythm.   Pulmonary:      Effort: Pulmonary effort is normal.      " Breath sounds: Normal breath sounds.   Musculoskeletal:      Cervical back: Tenderness present.   Lymphadenopathy:      Cervical: No cervical adenopathy.   Neurological:      General: No focal deficit present.      Mental Status: He is alert and oriented to person, place, and time.   Psychiatric:         Mood and Affect: Mood normal.         Assessment/Plan   Problem List Items Addressed This Visit    None  Visit Diagnoses         Codes    Acute intractable tension-type headache    -  Primary G44.201    Relevant Medications    cyclobenzaprine (Flexeril) 10 mg tablet    Eye strain     H53.10

## 2023-12-14 NOTE — PATIENT INSTRUCTIONS
Add on medications as directed    Headaches: Headaches are very common and there is multiple different reasons why people can have them.  In general most of the population have periodic headaches that respond to over-the-counter medications.  If you have a drastic change in the quality and quantity of your headaches, however, that may require further evaluation.  It is important to assess whether there are specific triggers for headaches.  A lot of people notice increased headaches with not sleeping , poor diet, stress or illness.  Make sure  you're getting adequate rest and eating a healthy diet.  Reducing your intake of caffeine can improve how often you have headaches as well.  Please notify your doctor if you have increased headaches or changes in headaches.  If you're taking prescription medications for your headaches and you're having side effects or problems with those medications or if they are not working please let us know.      Recommend eye exam

## 2024-07-22 ENCOUNTER — APPOINTMENT (OUTPATIENT)
Dept: PRIMARY CARE | Facility: CLINIC | Age: 24
End: 2024-07-22
Payer: COMMERCIAL

## 2024-07-22 VITALS
DIASTOLIC BLOOD PRESSURE: 101 MMHG | HEART RATE: 78 BPM | WEIGHT: 166.2 LBS | OXYGEN SATURATION: 98 % | SYSTOLIC BLOOD PRESSURE: 153 MMHG | HEIGHT: 67 IN | BODY MASS INDEX: 26.09 KG/M2

## 2024-07-22 DIAGNOSIS — K29.00 OTHER ACUTE GASTRITIS WITHOUT HEMORRHAGE: Primary | ICD-10-CM

## 2024-07-22 PROCEDURE — 99214 OFFICE O/P EST MOD 30 MIN: CPT | Performed by: FAMILY MEDICINE

## 2024-07-22 PROCEDURE — 1036F TOBACCO NON-USER: CPT | Performed by: FAMILY MEDICINE

## 2024-07-22 PROCEDURE — 3008F BODY MASS INDEX DOCD: CPT | Performed by: FAMILY MEDICINE

## 2024-07-22 RX ORDER — PANTOPRAZOLE SODIUM 40 MG/1
40 TABLET, DELAYED RELEASE ORAL DAILY
Qty: 30 TABLET | Refills: 1 | Status: SHIPPED | OUTPATIENT
Start: 2024-07-22 | End: 2024-09-20

## 2024-07-22 RX ORDER — SUCRALFATE 1 G/1
1 TABLET ORAL
Qty: 40 TABLET | Refills: 0 | Status: SHIPPED | OUTPATIENT
Start: 2024-07-22 | End: 2024-08-01

## 2024-07-22 ASSESSMENT — ENCOUNTER SYMPTOMS
DIZZINESS: 1
BLOOD IN STOOL: 0
CONSTIPATION: 0
DIARRHEA: 0

## 2024-07-22 NOTE — PROGRESS NOTES
"Subjective   Patient ID: Oswaldo Baird is a 24 y.o. male who presents for Abdominal Pain.  \"I think i may have a stomach ulcer my mom pointed out the symptoms and i want to get it checked out \"  Original symptoms are nausea and vomiting.  Twice he has vomited and there was a \"reddish\" color.  Pt does have continuous \"burping\" and heart burn.  The burping and heart burn have been a couple of months and the vomiting has been twice in last 2 weeks.  He would lay down in bed, 30 mins he would get warm and nauseous and then ended up vomiting. Lower abdomen is tender to the touch.               GERD over last couple months   1-2 times per week   Tums with relief     Emesis :  x 2  over last 2 weeks last was a week :     Red tinged,  :  had hot cheetos first episode  Second episode didn't have red food  Out of blue   Both episodes got nausea and sweaty then vomiting     Midline tenderness   Not a consistent pain     Ibuprofen with varicocele :  Seot -Dec   Nothing since then     No black stools   Alcohol occasionally              Review of Systems   Gastrointestinal:  Negative for blood in stool, constipation and diarrhea.   Neurological:  Positive for dizziness.       Objective   BP (!) 153/101   Pulse 78   Ht 1.708 m (5' 7.25\")   Wt 75.4 kg (166 lb 3.2 oz)   SpO2 98%   BMI 25.84 kg/m²     Physical Exam  Constitutional:       Appearance: Normal appearance. He is well-developed.   Cardiovascular:      Rate and Rhythm: Normal rate and regular rhythm.      Heart sounds: Normal heart sounds. No murmur heard.  Pulmonary:      Effort: Pulmonary effort is normal.      Breath sounds: Normal breath sounds.   Abdominal:      General: Abdomen is flat. There is no distension.      Palpations: Abdomen is soft. There is no mass.      Tenderness: There is no abdominal tenderness. There is no guarding.   Neurological:      General: No focal deficit present.      Mental Status: He is alert.   Psychiatric:         Mood and Affect: " Mood normal.         Behavior: Behavior normal.         Assessment/Plan   Problem List Items Addressed This Visit    None  Visit Diagnoses         Codes    Other acute gastritis without hemorrhage    -  Primary K29.00    Relevant Medications    pantoprazole (ProtoNix) 40 mg EC tablet    sucralfate (Carafate) 1 gram tablet    Other Relevant Orders    CBC    Comprehensive Metabolic Panel    Amylase

## 2024-07-22 NOTE — PATIENT INSTRUCTIONS
Gastritis versus possible ulcer, questionable episode of hematemesis, last episode was about a week ago, stools been normal since then so unlikely to be persistent bleeding but need to check blood work to assess for anemia    Will treat for GERD and gastritis, pending test results consider GI evaluation if needed      Get your blood work as ordered.  You should hear from our office with results whether they are normal are not within a few days.  Please call the office if you do not hear from us.      GERD  Take prescribed medication as written.  May take TUMS or Rolaids as needed.  No eating within 2 hours of going to bed.  May want to elevate the head of your bed.  Avoid caffeine, chocolate, alcohol, spicy foods, acidic foods, fried foods, mint flavoring.  Call or return to the office if your symptoms change,  worsen, or fail to improve.   There are 2 different types of acid reduction medications: The proton pump inhibitor such as Prilosec, Prevacid, pantoprazole, worked very well but they can take 7 to 8 hours to kick in.  We use these for things like ulcers and gastritis as well as chronic reflux, heartburn  The other class is H2 blockers this includes Pepcid, Pepcid Complete: This is a good option to take symptomatically because the onset of action is within 45 minutes to an hour.    Follow up if symptoms worsen or persist.    To ER with any vomiting blood or red     No

## 2024-07-23 ENCOUNTER — LAB (OUTPATIENT)
Dept: LAB | Facility: LAB | Age: 24
End: 2024-07-23
Payer: COMMERCIAL

## 2024-07-23 DIAGNOSIS — K29.00 OTHER ACUTE GASTRITIS WITHOUT HEMORRHAGE: ICD-10-CM

## 2024-07-23 LAB
ALBUMIN SERPL BCP-MCNC: 4.7 G/DL (ref 3.4–5)
ALP SERPL-CCNC: 54 U/L (ref 33–120)
ALT SERPL W P-5'-P-CCNC: 17 U/L (ref 10–52)
AMYLASE SERPL-CCNC: 98 U/L (ref 29–103)
ANION GAP SERPL CALC-SCNC: 11 MMOL/L (ref 10–20)
AST SERPL W P-5'-P-CCNC: 20 U/L (ref 9–39)
BILIRUB SERPL-MCNC: 0.4 MG/DL (ref 0–1.2)
BUN SERPL-MCNC: 20 MG/DL (ref 6–23)
CALCIUM SERPL-MCNC: 9.2 MG/DL (ref 8.6–10.3)
CHLORIDE SERPL-SCNC: 102 MMOL/L (ref 98–107)
CO2 SERPL-SCNC: 31 MMOL/L (ref 21–32)
CREAT SERPL-MCNC: 0.96 MG/DL (ref 0.5–1.3)
EGFRCR SERPLBLD CKD-EPI 2021: >90 ML/MIN/1.73M*2
ERYTHROCYTE [DISTWIDTH] IN BLOOD BY AUTOMATED COUNT: 11.9 % (ref 11.5–14.5)
GLUCOSE SERPL-MCNC: 87 MG/DL (ref 74–99)
HCT VFR BLD AUTO: 43.4 % (ref 41–52)
HGB BLD-MCNC: 15 G/DL (ref 13.5–17.5)
MCH RBC QN AUTO: 29.6 PG (ref 26–34)
MCHC RBC AUTO-ENTMCNC: 34.6 G/DL (ref 32–36)
MCV RBC AUTO: 86 FL (ref 80–100)
NRBC BLD-RTO: 0 /100 WBCS (ref 0–0)
PLATELET # BLD AUTO: 305 X10*3/UL (ref 150–450)
POTASSIUM SERPL-SCNC: 3.8 MMOL/L (ref 3.5–5.3)
PROT SERPL-MCNC: 7.1 G/DL (ref 6.4–8.2)
RBC # BLD AUTO: 5.06 X10*6/UL (ref 4.5–5.9)
SODIUM SERPL-SCNC: 140 MMOL/L (ref 136–145)
WBC # BLD AUTO: 9.1 X10*3/UL (ref 4.4–11.3)

## 2024-07-23 PROCEDURE — 82150 ASSAY OF AMYLASE: CPT

## 2024-07-23 PROCEDURE — 85027 COMPLETE CBC AUTOMATED: CPT

## 2024-07-23 PROCEDURE — 36415 COLL VENOUS BLD VENIPUNCTURE: CPT

## 2024-07-23 PROCEDURE — 80053 COMPREHEN METABOLIC PANEL: CPT

## 2025-01-06 ENCOUNTER — TELEPHONE (OUTPATIENT)
Dept: PRIMARY CARE | Facility: CLINIC | Age: 25
End: 2025-01-06
Payer: COMMERCIAL

## 2025-01-06 NOTE — TELEPHONE ENCOUNTER
Pt send my chart documenting chest pain and concern of other cardiac issues.  When spoken to he had vomited yesterday and today.  Symptoms were reviewed with nursing and pt was referred to ER.  Pt verbalized understanding and asked if Sarah was ok; it was stated to pt that the closest ER is best, regardless of where.

## 2025-01-07 ENCOUNTER — HOSPITAL ENCOUNTER (OUTPATIENT)
Dept: RADIOLOGY | Facility: HOSPITAL | Age: 25
Discharge: HOME | End: 2025-01-07
Payer: COMMERCIAL

## 2025-01-07 ENCOUNTER — OFFICE VISIT (OUTPATIENT)
Dept: PRIMARY CARE | Facility: CLINIC | Age: 25
End: 2025-01-07
Payer: COMMERCIAL

## 2025-01-07 VITALS
TEMPERATURE: 98.3 F | BODY MASS INDEX: 25.11 KG/M2 | DIASTOLIC BLOOD PRESSURE: 82 MMHG | WEIGHT: 160 LBS | HEIGHT: 67 IN | SYSTOLIC BLOOD PRESSURE: 120 MMHG | HEART RATE: 85 BPM | OXYGEN SATURATION: 96 %

## 2025-01-07 DIAGNOSIS — R10.13 EPIGASTRIC PAIN: ICD-10-CM

## 2025-01-07 DIAGNOSIS — K20.90 ESOPHAGITIS: ICD-10-CM

## 2025-01-07 DIAGNOSIS — R07.2 PRECORDIAL PAIN: Primary | ICD-10-CM

## 2025-01-07 PROCEDURE — 76705 ECHO EXAM OF ABDOMEN: CPT | Performed by: RADIOLOGY

## 2025-01-07 PROCEDURE — 99214 OFFICE O/P EST MOD 30 MIN: CPT | Performed by: FAMILY MEDICINE

## 2025-01-07 PROCEDURE — 1036F TOBACCO NON-USER: CPT | Performed by: FAMILY MEDICINE

## 2025-01-07 PROCEDURE — 3008F BODY MASS INDEX DOCD: CPT | Performed by: FAMILY MEDICINE

## 2025-01-07 PROCEDURE — 76705 ECHO EXAM OF ABDOMEN: CPT

## 2025-01-07 RX ORDER — SUCRALFATE 1 G/10ML
1 SUSPENSION ORAL
Qty: 400 ML | Refills: 0 | Status: SHIPPED | OUTPATIENT
Start: 2025-01-07 | End: 2025-01-09

## 2025-01-07 RX ORDER — PANTOPRAZOLE SODIUM 40 MG/1
40 TABLET, DELAYED RELEASE ORAL DAILY
Qty: 30 TABLET | Refills: 2 | Status: SHIPPED | OUTPATIENT
Start: 2025-01-07 | End: 2025-04-07

## 2025-01-07 ASSESSMENT — ENCOUNTER SYMPTOMS
VOMITING: 1
CONSTIPATION: 0
NAUSEA: 1
DIARRHEA: 0
BLOOD IN STOOL: 0
ABDOMINAL PAIN: 1

## 2025-01-07 NOTE — PROGRESS NOTES
"Subjective   Patient ID: Oswaldo Baird is a 24 y.o. male who presents for ER Follow-up regarding chest pains; states he went to [facility] yesterday and they ruled out any cardiac issues; did labs and a CXR advised her see his PCP. Pt states his sx's onset on [place] with intermittent nausea and the CP onset New years day. Pt has also been vomiting once a day the past three days; not eating a lot of solid foods but he is sipping on water and gibson. [facility] prescribed him an anti-nausea med which has helped.     Nausea since 12/24  intermittent   When wakes up   Dry mouth in Am, nausea   Eating things with chest pain  Mid chest into left lateral chest about 1 hr after eating , 30 -60 min     Was taking tums prior to New Years some relief   No GERD  a little epigastric pain      Now vomiting in AM since 1/1 ,  morning and night 3 times per day   No blood       No change in meds   Minimal alcohol   Minimal caffeine     No GI in past          Review of Systems   Gastrointestinal:  Positive for abdominal pain, nausea and vomiting. Negative for blood in stool, constipation and diarrhea.       Objective   /82   Pulse 85   Temp 36.8 °C (98.3 °F)   Ht 1.708 m (5' 7.25\")   Wt 72.6 kg (160 lb)   SpO2 96%   BMI 24.87 kg/m²     Physical Exam  Constitutional:       Appearance: Normal appearance. He is well-developed.   Cardiovascular:      Rate and Rhythm: Normal rate and regular rhythm.      Heart sounds: Normal heart sounds. No murmur heard.  Pulmonary:      Effort: Pulmonary effort is normal.      Breath sounds: Normal breath sounds.   Abdominal:      General: Abdomen is flat.      Palpations: Abdomen is soft.   Neurological:      General: No focal deficit present.      Mental Status: He is alert.         Assessment/Plan   Problem List Items Addressed This Visit    None  Visit Diagnoses         Codes    Precordial pain    -  Primary R07.2    Relevant Medications    pantoprazole (ProtoNix) 40 mg EC tablet    " sucralfate (Carafate) 100 mg/mL suspension    Other Relevant Orders    Referral to Gastroenterology    Esophagitis     K20.90    Relevant Medications    pantoprazole (ProtoNix) 40 mg EC tablet    sucralfate (Carafate) 100 mg/mL suspension    Other Relevant Orders    Referral to Gastroenterology    Epigastric pain     R10.13    Relevant Medications    pantoprazole (ProtoNix) 40 mg EC tablet    sucralfate (Carafate) 100 mg/mL suspension    Other Relevant Orders    Referral to Gastroenterology    US gallbladder

## 2025-01-07 NOTE — PATIENT INSTRUCTIONS
Suspect gastritis or esophagitis, rule out gallbladder  Cardiac and pulmonary workup negative     GI referral     Ultrasound     Meds as directed       GERD  Take prescribed medication as written.  May take TUMS or Rolaids as needed.  No eating within 2 hours of going to bed.  May want to elevate the head of your bed.  Avoid caffeine, chocolate, alcohol, spicy foods, acidic foods, fried foods, mint flavoring.  Call or return to the office if your symptoms change,  worsen, or fail to improve.   There are 2 different types of acid reduction medications: The proton pump inhibitor such as Prilosec, Prevacid, pantoprazole, worked very well but they can take 7 to 8 hours to kick in.  We use these for things like ulcers and gastritis as well as chronic reflux, heartburn  The other class is H2 blockers this includes Pepcid, Pepcid Complete: This is a good option to take symptomatically because the onset of action is within 45 minutes to an hour.

## 2025-01-09 DIAGNOSIS — R10.13 EPIGASTRIC PAIN: Primary | ICD-10-CM

## 2025-01-09 RX ORDER — SUCRALFATE 1 G/1
1 TABLET ORAL
Qty: 40 TABLET | Refills: 0 | Status: SHIPPED | OUTPATIENT
Start: 2025-01-09 | End: 2025-01-19

## 2025-01-09 NOTE — RESULT ENCOUNTER NOTE
Gallbladder ultrasound is normal, incidentally noted hemangiomas of the liver which are benign  Take the stomach medication as directed let me know if symptoms not improving in the next week or so Initial (On Arrival)

## 2025-02-12 ENCOUNTER — OFFICE VISIT (OUTPATIENT)
Dept: PRIMARY CARE | Facility: CLINIC | Age: 25
End: 2025-02-12
Payer: COMMERCIAL

## 2025-02-12 VITALS
OXYGEN SATURATION: 96 % | HEIGHT: 68 IN | SYSTOLIC BLOOD PRESSURE: 134 MMHG | DIASTOLIC BLOOD PRESSURE: 80 MMHG | HEART RATE: 98 BPM | TEMPERATURE: 98 F | WEIGHT: 160 LBS | BODY MASS INDEX: 24.25 KG/M2

## 2025-02-12 DIAGNOSIS — J01.00 ACUTE NON-RECURRENT MAXILLARY SINUSITIS: Primary | ICD-10-CM

## 2025-02-12 PROCEDURE — 3008F BODY MASS INDEX DOCD: CPT | Performed by: FAMILY MEDICINE

## 2025-02-12 PROCEDURE — 99213 OFFICE O/P EST LOW 20 MIN: CPT | Performed by: FAMILY MEDICINE

## 2025-02-12 PROCEDURE — 1036F TOBACCO NON-USER: CPT | Performed by: FAMILY MEDICINE

## 2025-02-12 RX ORDER — AMOXICILLIN 875 MG/1
875 TABLET, FILM COATED ORAL 2 TIMES DAILY
Qty: 20 TABLET | Refills: 0 | Status: SHIPPED | OUTPATIENT
Start: 2025-02-12 | End: 2025-02-22

## 2025-02-12 ASSESSMENT — PATIENT HEALTH QUESTIONNAIRE - PHQ9
2. FEELING DOWN, DEPRESSED OR HOPELESS: NOT AT ALL
1. LITTLE INTEREST OR PLEASURE IN DOING THINGS: NOT AT ALL
SUM OF ALL RESPONSES TO PHQ9 QUESTIONS 1 AND 2: 0

## 2025-02-12 ASSESSMENT — ENCOUNTER SYMPTOMS
FEVER: 0
SORE THROAT: 1
COUGH: 0

## 2025-02-12 NOTE — PATIENT INSTRUCTIONS
Follow up if symptoms worsen or persist.       You may take over-the-counter medications as needed for symptom relief.  Call the office if your symptoms worsen such as high fever and worsening cough or increase in symptoms.

## 2025-02-12 NOTE — PROGRESS NOTES
"Subjective   Patient ID: Oswaldo Baird is a 24 y.o. male who presents for Sinusitis, sx's onset this Monday. Pt also c/o left ear pain and a ST.     3 days   Nasal congestion   Pressure     Ear pain          Review of Systems   Constitutional:  Negative for fever.   HENT:  Positive for congestion and sore throat.    Respiratory:  Negative for cough.        Objective   /80   Pulse 98   Temp 36.7 °C (98 °F)   Ht 1.715 m (5' 7.5\")   Wt 72.6 kg (160 lb)   SpO2 96%   BMI 24.69 kg/m²     Physical Exam  Constitutional:       Appearance: Normal appearance.   HENT:      Head: Normocephalic and atraumatic.      Right Ear: Tympanic membrane and ear canal normal.      Left Ear: Tympanic membrane and ear canal normal.      Nose: No nasal deformity.      Right Sinus: Maxillary sinus tenderness present. No frontal sinus tenderness.      Left Sinus: Maxillary sinus tenderness present. No frontal sinus tenderness.      Mouth/Throat:      Mouth: Mucous membranes are moist. No oral lesions.      Tongue: No lesions.      Pharynx: Oropharynx is clear.   Cardiovascular:      Rate and Rhythm: Normal rate and regular rhythm.   Pulmonary:      Effort: Pulmonary effort is normal.      Breath sounds: Normal breath sounds.   Musculoskeletal:      Cervical back: No tenderness.   Lymphadenopathy:      Cervical: No cervical adenopathy.   Neurological:      Mental Status: He is alert.         Assessment/Plan   Problem List Items Addressed This Visit    None  Visit Diagnoses         Codes    Acute non-recurrent maxillary sinusitis    -  Primary J01.00    Relevant Medications    amoxicillin (Amoxil) 875 mg tablet               "

## 2025-02-13 ENCOUNTER — APPOINTMENT (OUTPATIENT)
Dept: PRIMARY CARE | Facility: CLINIC | Age: 25
End: 2025-02-13
Payer: COMMERCIAL

## 2025-03-10 ENCOUNTER — APPOINTMENT (OUTPATIENT)
Facility: CLINIC | Age: 25
End: 2025-03-10
Payer: COMMERCIAL

## 2025-03-14 ENCOUNTER — APPOINTMENT (OUTPATIENT)
Facility: CLINIC | Age: 25
End: 2025-03-14
Payer: COMMERCIAL